# Patient Record
Sex: FEMALE | Race: WHITE | Employment: UNEMPLOYED | URBAN - METROPOLITAN AREA
[De-identification: names, ages, dates, MRNs, and addresses within clinical notes are randomized per-mention and may not be internally consistent; named-entity substitution may affect disease eponyms.]

---

## 2021-09-30 ENCOUNTER — HOSPITAL ENCOUNTER (EMERGENCY)
Age: 31
Discharge: HOME | End: 2021-10-01
Attending: EMERGENCY MEDICINE
Payer: MEDICAID

## 2021-09-30 ENCOUNTER — APPOINTMENT (OUTPATIENT)
Dept: GENERAL RADIOLOGY | Age: 31
End: 2021-09-30
Payer: MEDICAID

## 2021-09-30 VITALS
HEART RATE: 113 BPM | TEMPERATURE: 98.7 F | SYSTOLIC BLOOD PRESSURE: 144 MMHG | OXYGEN SATURATION: 94 % | RESPIRATION RATE: 24 BRPM | DIASTOLIC BLOOD PRESSURE: 87 MMHG

## 2021-09-30 DIAGNOSIS — R07.9 CHEST PAIN, UNSPECIFIED TYPE: Primary | ICD-10-CM

## 2021-09-30 DIAGNOSIS — F11.90 OPIOID USE DISORDER: ICD-10-CM

## 2021-09-30 DIAGNOSIS — F14.90 CRACK COCAINE USE: ICD-10-CM

## 2021-09-30 LAB
ANION GAP SERPL CALCULATED.3IONS-SCNC: 14 MMOL/L (ref 3–16)
BASE EXCESS VENOUS: 4.2 MMOL/L (ref -2–3)
BASOPHILS ABSOLUTE: 0.1 K/UL (ref 0–0.2)
BASOPHILS RELATIVE PERCENT: 0.7 %
BUN BLDV-MCNC: 11 MG/DL (ref 7–20)
CALCIUM SERPL-MCNC: 9.1 MG/DL (ref 8.3–10.6)
CARBOXYHEMOGLOBIN: 5.2 % (ref 0–1.5)
CHLORIDE BLD-SCNC: 103 MMOL/L (ref 99–110)
CO2: 23 MMOL/L (ref 21–32)
CREAT SERPL-MCNC: 0.6 MG/DL (ref 0.6–1.1)
EOSINOPHILS ABSOLUTE: 0.2 K/UL (ref 0–0.6)
EOSINOPHILS RELATIVE PERCENT: 2.2 %
GFR AFRICAN AMERICAN: >60
GFR NON-AFRICAN AMERICAN: >60
GLUCOSE BLD-MCNC: 89 MG/DL (ref 70–99)
HCO3 VENOUS: 26.6 MMOL/L (ref 24–28)
HCT VFR BLD CALC: 37.1 % (ref 36–48)
HEMOGLOBIN, VEN, REDUCED: 2.7 %
HEMOGLOBIN: 13 G/DL (ref 12–16)
LYMPHOCYTES ABSOLUTE: 2.8 K/UL (ref 1–5.1)
LYMPHOCYTES RELATIVE PERCENT: 38.3 %
MCH RBC QN AUTO: 31.2 PG (ref 26–34)
MCHC RBC AUTO-ENTMCNC: 34.9 G/DL (ref 31–36)
MCV RBC AUTO: 89.3 FL (ref 80–100)
METHEMOGLOBIN VENOUS: 0.2 % (ref 0–1.5)
MONOCYTES ABSOLUTE: 0.5 K/UL (ref 0–1.3)
MONOCYTES RELATIVE PERCENT: 7.3 %
NEUTROPHILS ABSOLUTE: 3.8 K/UL (ref 1.7–7.7)
NEUTROPHILS RELATIVE PERCENT: 51.5 %
O2 SAT, VEN: 97 %
PCO2, VEN: 32 MMHG (ref 41–51)
PDW BLD-RTO: 12.3 % (ref 12.4–15.4)
PH VENOUS: 7.53 (ref 7.35–7.45)
PLATELET # BLD: 311 K/UL (ref 135–450)
PMV BLD AUTO: 7.8 FL (ref 5–10.5)
PO2, VEN: 70.4 MMHG (ref 25–40)
POTASSIUM REFLEX MAGNESIUM: 3.2 MMOL/L (ref 3.5–5.1)
RBC # BLD: 4.16 M/UL (ref 4–5.2)
SODIUM BLD-SCNC: 140 MMOL/L (ref 136–145)
TCO2 CALC VENOUS: 28 MMOL/L
WBC # BLD: 7.3 K/UL (ref 4–11)

## 2021-09-30 PROCEDURE — 83735 ASSAY OF MAGNESIUM: CPT

## 2021-09-30 PROCEDURE — 80048 BASIC METABOLIC PNL TOTAL CA: CPT

## 2021-09-30 PROCEDURE — 71046 X-RAY EXAM CHEST 2 VIEWS: CPT

## 2021-09-30 PROCEDURE — 99283 EMERGENCY DEPT VISIT LOW MDM: CPT

## 2021-09-30 PROCEDURE — 36415 COLL VENOUS BLD VENIPUNCTURE: CPT

## 2021-09-30 PROCEDURE — 96374 THER/PROPH/DIAG INJ IV PUSH: CPT

## 2021-09-30 PROCEDURE — 93005 ELECTROCARDIOGRAM TRACING: CPT | Performed by: PHYSICIAN ASSISTANT

## 2021-09-30 PROCEDURE — 6360000002 HC RX W HCPCS: Performed by: PHYSICIAN ASSISTANT

## 2021-09-30 PROCEDURE — 84484 ASSAY OF TROPONIN QUANT: CPT

## 2021-09-30 PROCEDURE — 82803 BLOOD GASES ANY COMBINATION: CPT

## 2021-09-30 PROCEDURE — 85025 COMPLETE CBC W/AUTO DIFF WBC: CPT

## 2021-09-30 RX ORDER — LORAZEPAM 2 MG/ML
1 INJECTION INTRAMUSCULAR ONCE
Status: COMPLETED | OUTPATIENT
Start: 2021-09-30 | End: 2021-09-30

## 2021-09-30 RX ADMIN — LORAZEPAM 1 MG: 2 INJECTION INTRAMUSCULAR; INTRAVENOUS at 23:31

## 2021-09-30 ASSESSMENT — PAIN SCALES - GENERAL: PAINLEVEL_OUTOF10: 10

## 2021-09-30 ASSESSMENT — PAIN DESCRIPTION - LOCATION: LOCATION: CHEST

## 2021-09-30 ASSESSMENT — PAIN DESCRIPTION - PAIN TYPE: TYPE: ACUTE PAIN

## 2021-09-30 ASSESSMENT — PAIN DESCRIPTION - DESCRIPTORS: DESCRIPTORS: PRESSURE;HEAVINESS

## 2021-10-01 LAB
BILIRUBIN URINE: NEGATIVE
BLOOD, URINE: NEGATIVE
CLARITY: CLEAR
COLOR: YELLOW
EKG ATRIAL RATE: 125 BPM
EKG DIAGNOSIS: NORMAL
EKG P AXIS: 5 DEGREES
EKG P-R INTERVAL: 126 MS
EKG Q-T INTERVAL: 332 MS
EKG QRS DURATION: 92 MS
EKG QTC CALCULATION (BAZETT): 479 MS
EKG R AXIS: 72 DEGREES
EKG T AXIS: 39 DEGREES
EKG VENTRICULAR RATE: 125 BPM
GLUCOSE URINE: NEGATIVE MG/DL
KETONES, URINE: NEGATIVE MG/DL
LEUKOCYTE ESTERASE, URINE: NEGATIVE
MAGNESIUM: 2 MG/DL (ref 1.8–2.4)
MICROSCOPIC EXAMINATION: NORMAL
NITRITE, URINE: NEGATIVE
PH UA: 7 (ref 5–8)
PROTEIN UA: NEGATIVE MG/DL
SPECIFIC GRAVITY UA: 1.01 (ref 1–1.03)
TROPONIN: <0.01 NG/ML
URINE TYPE: NORMAL
UROBILINOGEN, URINE: 0.2 E.U./DL

## 2021-10-01 PROCEDURE — 81003 URINALYSIS AUTO W/O SCOPE: CPT

## 2021-10-01 RX ORDER — DROPERIDOL 2.5 MG/ML
0.62 INJECTION, SOLUTION INTRAMUSCULAR; INTRAVENOUS ONCE
Status: DISCONTINUED | OUTPATIENT
Start: 2021-10-01 | End: 2021-10-01 | Stop reason: HOSPADM

## 2021-10-01 NOTE — ED PROVIDER NOTES
ED Attending Attestation Note     Date of evaluation: 9/30/2021    This patient was seen by the advance practice provider. I have seen and examined the patient, agree with the workup, evaluation, management and diagnosis. The care plan has been discussed. I have reviewed the ECG and concur with the JASWINDER's interpretation. My assessment reveals patient with polysubstance abuse presents complaining of chest pain. Patient is mildly tachycardic but otherwise hemodynamically stable. Will check laboratory studies.       Galen Perales MD  10/01/21 2122

## 2021-10-01 NOTE — ED TRIAGE NOTES
Patient arrived to ED with complaints of chest pain. Patient used crack and  Fentanyl earlier today.

## 2021-10-04 ASSESSMENT — ENCOUNTER SYMPTOMS
EYE ITCHING: 0
COUGH: 0
COLOR CHANGE: 0
SORE THROAT: 0
ABDOMINAL DISTENTION: 0
VOMITING: 0
RHINORRHEA: 0
DIARRHEA: 0
BACK PAIN: 0
ABDOMINAL PAIN: 0
EYE REDNESS: 0
CHEST TIGHTNESS: 0
SHORTNESS OF BREATH: 0
WHEEZING: 0
NAUSEA: 0
SINUS PRESSURE: 0
EYE PAIN: 0

## 2021-10-04 NOTE — ED PROVIDER NOTES
[address] [facility] ENCOUNTER          PHYSICIAN ASSISTANT NOTE       Date of evaluation: 9/30/2021    Chief Complaint     Chest Pain and Drug / Alcohol Assessment      History of Present Illness     James Chan is a 36 y.o. female with a past medical history as noted below who presents to the Emergency Department with a complaint of chest pain and arm numbness. The patient reports that approximately an hour prior to emergency department arrival, she had smoked crack cocaine and experience chest pain developed numbness in her bilateral hands and fingers. The patient reports that she routinely smokes crack cocaine, as well as marijuana and does heroin and fentanyl on a regular basis. She reports that the chest pain and numbness is made her significantly agitated and she is somewhat thrashing about on the bed in discomfort. Reports episodes of chest pain in the past, but has never had similar episode to this previously. No history of coronary artery disease or thrombotic disease. Her pain is reported as left-sided, with pressure and heaviness radiating to her left arm with numbness and tingling in her bilateral hands and fingers. She rates pain as a 10 out of 10. She has not taken anything for her pain. She used fentanyl earlier today at approximately 1:00 PM.  She also did a \"rinse\" which is rinsing the cotton unit earlier fentanyl injection for a subsequent injection approximately 2 to 3 hours ago. The patient reports that she can go approximately 5 to 6 hours before she begins to feel dope sick. She is attempted drug rehab numerous times in the past, but has never had an extended period of sobriety in the past 15 years. Denies any recent fevers, chills, sweats or other constitutional symptoms    Review of Systems     Review of Systems   Constitutional: Negative for chills, diaphoresis, fever and unexpected weight change.    HENT: Negative for congestion, drooling, mouth sores, postnasal drip, rhinorrhea, sinus pressure and sore throat. Eyes: Negative for pain, redness and itching. Respiratory: Negative for cough, chest tightness, shortness of breath and wheezing. Cardiovascular: Positive for chest pain and palpitations. Negative for leg swelling. Gastrointestinal: Negative for abdominal distention, abdominal pain, diarrhea, nausea and vomiting. Genitourinary: Negative for dysuria and hematuria. Musculoskeletal: Negative for arthralgias, back pain, gait problem, myalgias, neck pain and neck stiffness. Skin: Negative for color change, pallor and rash. Neurological: Positive for numbness (Tingling sensation in hands and fingers). Negative for dizziness, syncope, weakness, light-headedness and headaches. Hematological: Does not bruise/bleed easily. Psychiatric/Behavioral: Negative for agitation, hallucinations, self-injury, sleep disturbance and suicidal ideas. The patient is not nervous/anxious. All other systems reviewed and are negative. Past Medical, Surgical, Family, and Social History     She has no past medical history on file. She has no past surgical history on file. Her family history is not on file. She reports that she has been smoking. She has been smoking about 1.00 pack per day. She has never used smokeless tobacco. She reports current alcohol use. She reports current drug use. Drugs: Cocaine and IV. Medications     There are no discharge medications for this patient. Allergies     She has No Known Allergies.     Physical Exam     INITIAL VITALS: BP: (!) 144/87, Temp: 98.7 °F (37.1 °C), Pulse: 113, Resp: 24, SpO2: 94 %  Physical Exam    Diagnostic Results     EKG   Interpreted in conjunction with emergency department physician, Miguel Ángel Tafoya MD  Rhythm: normal sinus   Rate: normal  Axis: normal  Ectopy: none  Conduction: normal  ST Segments: normal  T Waves: normal  Q Waves:none  Clinical Impression: Sinus rhythm, normal axis, no ectopy, normal QTC, no evidence of ischemia, injury or infarct  Comparison: No prior ECG for comparison    RADIOLOGY:  XR CHEST (2 VW)   Final Result      No acute pulmonary disease.              LABS:   Results for orders placed or performed during the hospital encounter of 09/30/21   CBC Auto Differential   Result Value Ref Range    WBC 7.3 4.0 - 11.0 K/uL    RBC 4.16 4.00 - 5.20 M/uL    Hemoglobin 13.0 12.0 - 16.0 g/dL    Hematocrit 37.1 36.0 - 48.0 %    MCV 89.3 80.0 - 100.0 fL    MCH 31.2 26.0 - 34.0 pg    MCHC 34.9 31.0 - 36.0 g/dL    RDW 12.3 (L) 12.4 - 15.4 %    Platelets 961 968 - 931 K/uL    MPV 7.8 5.0 - 10.5 fL    Neutrophils % 51.5 %    Lymphocytes % 38.3 %    Monocytes % 7.3 %    Eosinophils % 2.2 %    Basophils % 0.7 %    Neutrophils Absolute 3.8 1.7 - 7.7 K/uL    Lymphocytes Absolute 2.8 1.0 - 5.1 K/uL    Monocytes Absolute 0.5 0.0 - 1.3 K/uL    Eosinophils Absolute 0.2 0.0 - 0.6 K/uL    Basophils Absolute 0.1 0.0 - 0.2 K/uL   Basic Metabolic Panel w/ Reflex to MG   Result Value Ref Range    Sodium 140 136 - 145 mmol/L    Potassium reflex Magnesium 3.2 (L) 3.5 - 5.1 mmol/L    Chloride 103 99 - 110 mmol/L    CO2 23 21 - 32 mmol/L    Anion Gap 14 3 - 16    Glucose 89 70 - 99 mg/dL    BUN 11 7 - 20 mg/dL    CREATININE 0.6 0.6 - 1.1 mg/dL    GFR Non-African American >60 >60    GFR African American >60 >60    Calcium 9.1 8.3 - 10.6 mg/dL   Troponin   Result Value Ref Range    Troponin <0.01 <0.01 ng/mL   Urinalysis, reflex to microscopic   Result Value Ref Range    Color, UA Yellow Straw/Yellow    Clarity, UA Clear Clear    Glucose, Ur Negative Negative mg/dL    Bilirubin Urine Negative Negative    Ketones, Urine Negative Negative mg/dL    Specific Gravity, UA 1.010 1.005 - 1.030    Blood, Urine Negative Negative    pH, UA 7.0 5.0 - 8.0    Protein, UA Negative Negative mg/dL    Urobilinogen, Urine 0.2 <2.0 E.U./dL    Nitrite, Urine Negative Negative    Leukocyte Esterase, Urine Negative Negative Microscopic Examination Not Indicated     Urine Type Voided    Blood Gas, Venous   Result Value Ref Range    pH, Jeramy 7.527 (H) 7.350 - 7.450    pCO2, Jeramy 32.0 (L) 41.0 - 51.0 mmHg    pO2, Jeramy 70.4 (H) 25 - 40 mmHg    HCO3, Venous 26.6 24.0 - 28.0 mmol/L    Base Excess, Jeramy 4.2 (H) -2.0 - 3.0 mmol/L    O2 Sat, Jeramy 97 Not established %    Carboxyhemoglobin 5.2 (H) 0.0 - 1.5 %    MetHgb, Jeramy 0.2 0.0 - 1.5 %    TC02 (Calc), Jeramy 28 mmol/L    Hemoglobin, Jeramy, Reduced 2.70 %   Magnesium   Result Value Ref Range    Magnesium 2.00 1.80 - 2.40 mg/dL   EKG 12 Lead   Result Value Ref Range    Ventricular Rate 125 BPM    Atrial Rate 125 BPM    P-R Interval 126 ms    QRS Duration 92 ms    Q-T Interval 332 ms    QTc Calculation (Bazett) 479 ms    P Axis 5 degrees    R Axis 72 degrees    T Axis 39 degrees    Diagnosis       EKG performed in ER and to be interpreted by ER physician. Confirmed by MD, ER ([id number]),  Meryl Quiles ([id number]) on 10/1/2021 6:03:53 AM       ED BEDSIDE ULTRASOUND:  N/A    RECENT VITALS:  BP: (!) 144/87, Temp: 98.7 °F (37.1 °C), Pulse: 113, Resp: 24, SpO2: 94 %     Procedures     N/A    ED Course     Nursing Notes, Past Medical Hx,Past Surgical Hx, Social Hx, Allergies, and Family Hx were reviewed. The patient was given the following medications:  Orders Placed This Encounter   Medications    LORazepam (ATIVAN) injection 1 mg    DISCONTD: droperidol (INAPSINE) injection 0.625 mg       CONSULTS:  None    MEDICAL DECISION MAKING / ASSESSMENT / Mata Garcia is admitted to the Emergency Department for evaluation of her chief complaint as described in the history of present illness. Complete history and physical was performed by me and my attending. Nursing notes, past medical history, surgical history, family history and social history were reviewed and addressed in the HPI. Benedict Cho is a 36 y.o. female who presents to the emergency department with a complaint of chest pain. Patient reports that approximately an hour prior to presentation to the emergency department, she was smoking crack cocaine. The patient did develop chest pain, which she reported made her very anxious and agitated. Patient reports that her respiratory rate increased frequently and she felt like she was hyperventilating. She subsequently developed numbness and tingling in the hands and fingers bilaterally. She reports that she felt like her heart was going to beat out of her chest.     On arrival to the emergency department, the patient is hemodynamically stable with out noted tachycardia, slight tachypnea and a normal blood pressure. She is agitated and thrashing around in the bed. The patient came to herself, though she denies any visual or auditory hallucinations. Initial twelve-lead EKG demonstrates no evidence of ischemia, injury or infarct. The patient was administered 1 mg of Ativan IV for agitation and to relieve any potential vasospasm caused by the stimulant which could be causing her chest pain. She noted some significant improvement in her symptoms after administration of this medication. Her chest x-ray is unremarkable without evidence of chronic lung or infection. Initial VBG demonstrates respiratory alkalosis and is consistent with her tachypnea. Initial troponin is negative. CBC demonstrates no evidence of anemia, thrombocytopenia or leukocytosis. Her BMP demonstrates slight hypokalemia, which we will correct with diet, but no other electrolyte or renal abnormalities. Her anion gap and bicarbonate levels are normal.  After administration of IV fluids the patient was able to settle down. She continues to report some mild agitation and droperidol was ordered, but the patient refused this medication and instead requested discharge home. The patient has been evaluated and the history and physical exam suggest a benign etiology.   I see nothing to suggest coronary ischemia, myocardial infarction, pulmonary embolism, thoracic aortic dissection, significant pericarditis, pneumonia, pneumothorax, or acute abdomen. I feel the patient can be safely discharged to home with outpatient follow up. Instructions have been given for the patient to return to the ED for any worsening of the symptoms, including but not limited to increased pain, shortness of breath, abdominal pain or weakness. I discussed this plan at length the patient who verbalizes understanding and is in agreement. The patient is currently stable and will be discharged home for continued self-care. Please see patient's AVS for additional discharge instructions. The patient was seen and evaluated by myself and the attending physician, Tori Levy MD, who agrees with my assessment, treatment and plan. Clinical Impression     1. Chest pain, unspecified type    2. Crack cocaine use    3. Opioid use disorder        Disposition     PATIENT REFERRED TO:  Addiction services, [facility]  [phone]  Schedule an appointment as soon as possible for a visit       [facility]  [address] [place] [address]  [address]  [phone]  Go to   If symptoms worsen      DISCHARGE MEDICATIONS:  There are no discharge medications for this patient.       DISPOSITION Decision To Discharge 10/01/2021 12:54:09 AM     FRACISCO Mason  10/04/21 [id number]

## 2022-06-14 ENCOUNTER — OFFICE VISIT (OUTPATIENT)
Dept: GYNECOLOGY | Age: 32
End: 2022-06-14
Payer: COMMERCIAL

## 2022-06-14 VITALS
HEART RATE: 78 BPM | BODY MASS INDEX: 26.02 KG/M2 | HEIGHT: 64 IN | WEIGHT: 152.4 LBS | SYSTOLIC BLOOD PRESSURE: 108 MMHG | RESPIRATION RATE: 17 BRPM | OXYGEN SATURATION: 97 % | DIASTOLIC BLOOD PRESSURE: 62 MMHG

## 2022-06-14 DIAGNOSIS — N92.6 IRREGULAR MENSTRUAL BLEEDING: ICD-10-CM

## 2022-06-14 DIAGNOSIS — Z01.419 WELL WOMAN EXAM WITH ROUTINE GYNECOLOGICAL EXAM: Primary | ICD-10-CM

## 2022-06-14 PROCEDURE — 99385 PREV VISIT NEW AGE 18-39: CPT | Performed by: OBSTETRICS & GYNECOLOGY

## 2022-06-14 RX ORDER — OLANZAPINE 5 MG/1
5 TABLET ORAL DAILY
COMMUNITY
Start: 2022-06-09

## 2022-06-14 RX ORDER — ETONOGESTREL 68 MG/1
68 IMPLANT SUBCUTANEOUS ONCE
COMMUNITY

## 2022-06-14 RX ORDER — LAMOTRIGINE 150 MG/1
150 TABLET ORAL DAILY
COMMUNITY
Start: 2022-04-20

## 2022-06-14 RX ORDER — PROPRANOLOL HYDROCHLORIDE 10 MG/1
10 TABLET ORAL DAILY
COMMUNITY
Start: 2022-06-09

## 2022-06-14 RX ORDER — BUPROPION HYDROCHLORIDE 150 MG/1
150 TABLET ORAL EVERY MORNING
COMMUNITY
Start: 2022-06-09 | End: 2023-06-09

## 2022-06-16 LAB
C TRACH DNA GENITAL QL NAA+PROBE: NEGATIVE
N. GONORRHOEAE DNA: NEGATIVE

## 2022-06-17 ASSESSMENT — ENCOUNTER SYMPTOMS
RESPIRATORY NEGATIVE: 1
EYES NEGATIVE: 1
GASTROINTESTINAL NEGATIVE: 1

## 2022-06-18 NOTE — PROGRESS NOTES
Subjective:      Patient ID: Latisha Mckeon is a 29 y.o. female. Patient is here for annual. Patient with irregular bleeding. Nexplanon has . Gynecologic Exam        Review of Systems   Constitutional: Negative. HENT: Negative. Eyes: Negative. Respiratory: Negative. Cardiovascular: Negative. Gastrointestinal: Negative. Genitourinary: Positive for menstrual problem. Musculoskeletal: Negative. Skin: Negative. Neurological: Negative. Psychiatric/Behavioral: Negative. Date of Birth 1990  Past Medical History:   Diagnosis Date    Abnormal Pap smear of cervix     Chlamydia     Irregular uterine bleeding     Urogenital trichomoniasis      Past Surgical History:   Procedure Laterality Date    DILATION AND CURETTAGE OF UTERUS       Pt had aboration     OB History    Para Term  AB Living   1       1     SAB IAB Ectopic Molar Multiple Live Births                    # Outcome Date GA Lbr Yaw/2nd Weight Sex Delivery Anes PTL Lv   1 AB               Obstetric Comments   Patient had an  in . Social History     Socioeconomic History    Marital status: Single     Spouse name: Not on file    Number of children: Not on file    Years of education: Not on file    Highest education level: Not on file   Occupational History    Not on file   Tobacco Use    Smoking status: Current Every Day Smoker     Packs/day: 1.00    Smokeless tobacco: Never Used   Vaping Use    Vaping Use: Never used   Substance and Sexual Activity    Alcohol use:  Yes    Drug use: Yes     Types: Cocaine, IV     Comment: FENTANYL    Sexual activity: Yes     Partners: Male   Other Topics Concern    Not on file   Social History Narrative    Not on file     Social Determinants of Health     Financial Resource Strain:     Difficulty of Paying Living Expenses: Not on file   Food Insecurity:     Worried About Running Out of Food in the Last Year: Not on file   Nirmal Monaco Ran Out of Food in the Last Year: Not on file   Transportation Needs:     Lack of Transportation (Medical): Not on file    Lack of Transportation (Non-Medical): Not on file   Physical Activity:     Days of Exercise per Week: Not on file    Minutes of Exercise per Session: Not on file   Stress:     Feeling of Stress : Not on file   Social Connections:     Frequency of Communication with Friends and Family: Not on file    Frequency of Social Gatherings with Friends and Family: Not on file    Attends [employer] Services: Not on file    Active Member of [address] [place] [address] [employer] or Organizations: Not on file    Attends Club or Organization Meetings: Not on file    Marital Status: Not on file   Intimate Partner Violence:     Fear of Current or Ex-Partner: Not on file    Emotionally Abused: Not on file    Physically Abused: Not on file    Sexually Abused: Not on file   Housing Stability:     Unable to Pay for Housing in the Last Year: Not on file    Number of [place] in the Last Year: Not on file    Unstable Housing in the Last Year: Not on file     No Known Allergies  Outpatient Medications Marked as Taking for the 6/14/22 encounter (Office Visit) with Shanelle Richey MD   Medication Sig Dispense Refill    buPROPion (WELLBUTRIN XL) 150 MG extended release tablet Take 150 mg by mouth every morning      lamoTRIgine (LAMICTAL) 150 MG tablet Take 150 mg by mouth daily      propranolol (INDERAL) 10 MG tablet Take 10 mg by mouth daily      OLANZapine (ZYPREXA) 5 MG tablet Take 5 mg by mouth daily      etonogestrel (NEXPLANON) 68 MG implant 68 mg by Subdermal route once 2016       History reviewed. No pertinent family history. /62 (Site: Right Upper Arm, Position: Sitting, Cuff Size: Large Adult)   Pulse 78   Resp 17   Ht 5' 4\" (1.626 m)   Wt 152 lb 6.4 oz (69.1 kg)   SpO2 97%   BMI 26.16 kg/m²       Objective:   Physical Exam  Constitutional:       Appearance: Normal appearance.  She is well-developed and normal weight. HENT:      Head: Normocephalic. Nose: Nose normal.      Mouth/Throat:      Mouth: Mucous membranes are moist.      Pharynx: Oropharynx is clear. Eyes:      Pupils: Pupils are equal, round, and reactive to light. Neck:      Thyroid: No thyromegaly. Cardiovascular:      Rate and Rhythm: Normal rate and regular rhythm. Pulses: Normal pulses. Heart sounds: Normal heart sounds. No murmur heard. No friction rub. No gallop. Pulmonary:      Effort: Pulmonary effort is normal. No respiratory distress. Breath sounds: Normal breath sounds. No stridor. No wheezing, rhonchi or rales. Chest:      Chest wall: No tenderness. Breasts:      Right: Normal. No swelling, bleeding, inverted nipple, mass, nipple discharge, skin change or tenderness. Left: Normal. No swelling, bleeding, inverted nipple, mass, nipple discharge, skin change or tenderness. Abdominal:      General: Bowel sounds are normal. There is no distension. Palpations: Abdomen is soft. There is no mass. Tenderness: There is no abdominal tenderness. There is no guarding or rebound. Hernia: No hernia is present. There is no hernia in the left inguinal area. Genitourinary:     General: Normal vulva. Exam position: Lithotomy position. Pubic Area: No rash. Labia:         Right: No rash, tenderness, lesion or injury. Left: No rash, tenderness, lesion or injury. Urethra: No prolapse, urethral pain, urethral swelling or urethral lesion. Vagina: No signs of injury and foreign body. No vaginal discharge, erythema, tenderness, bleeding, lesions or prolapsed vaginal walls. Cervix: No cervical motion tenderness, discharge, friability, lesion, erythema, cervical bleeding or eversion. Uterus: Not deviated, not enlarged, not fixed and not tender. Adnexa:         Right: No mass, tenderness or fullness. Left: No mass, tenderness or fullness. Rectum: No anal fissure or external hemorrhoid. Comments: Normal urethral meatus, normal urethra, nl bladder  Musculoskeletal:         General: No tenderness. Normal range of motion. Cervical back: Normal range of motion and neck supple. No rigidity. Lymphadenopathy:      Cervical: No cervical adenopathy. Lower Body: No right inguinal adenopathy. No left inguinal adenopathy. Skin:     General: Skin is warm and dry. Coloration: Skin is not pale. Findings: No erythema or rash. Neurological:      General: No focal deficit present. Mental Status: She is alert and oriented to person, place, and time. Mental status is at baseline. Deep Tendon Reflexes: Reflexes are normal and symmetric. Psychiatric:         Mood and Affect: Mood normal.         Behavior: Behavior normal.         Thought Content: Thought content normal.         Judgment: Judgment normal.         Assessment:      1. Annual  2. Irregular menses      Plan:      1. Pap, calcium, exercise  2. Feel due to Nexplanon-gave her numbers to call to get removed. Check labs and dna probe.  Can consider ocps but must have Nexplanon out first.        Marcio Cabrera MD

## 2023-08-17 ENCOUNTER — TELEPHONE (OUTPATIENT)
Dept: GYNECOLOGY | Age: 33
End: 2023-08-17

## 2023-08-17 DIAGNOSIS — Z20.6 EXPOSURE TO HIV: Primary | ICD-10-CM

## 2023-08-17 NOTE — TELEPHONE ENCOUNTER
Called and spoke to patient related full message to patient from Dr Tiffanie Rueda. Patient says she is at [facility] ER right now and that this HIV testing is been taken care she says she needed this to be taken care of right away.   Informed Dr Tiffanie Rueda of conservation with patient

## 2023-08-17 NOTE — TELEPHONE ENCOUNTER
Tell patient that she can go to [facility] lab and get HIV. If she wants other testing for STI, she needs to do an e-visit. Tell patient she will need retested 6 months after her exposure as well.

## 2023-08-17 NOTE — TELEPHONE ENCOUNTER
Patient think she has been exposed to HIV and would like to be tested asap.  Please advise      Patient can be reached at [phone]

## 2023-11-11 ENCOUNTER — HOSPITAL ENCOUNTER (EMERGENCY)
Age: 33
Discharge: ANOTHER HOSPITAL | End: 2023-11-11
Attending: EMERGENCY MEDICINE
Payer: COMMERCIAL

## 2023-11-11 ENCOUNTER — APPOINTMENT (OUTPATIENT)
Dept: GENERAL RADIOLOGY | Age: 33
End: 2023-11-11
Payer: COMMERCIAL

## 2023-11-11 VITALS
DIASTOLIC BLOOD PRESSURE: 85 MMHG | WEIGHT: 150 LBS | SYSTOLIC BLOOD PRESSURE: 119 MMHG | TEMPERATURE: 98 F | BODY MASS INDEX: 25.61 KG/M2 | HEART RATE: 73 BPM | OXYGEN SATURATION: 99 % | RESPIRATION RATE: 18 BRPM | HEIGHT: 64 IN

## 2023-11-11 DIAGNOSIS — S87.81XA CRUSH INJURY LOWER LEG, RIGHT, INITIAL ENCOUNTER: Primary | ICD-10-CM

## 2023-11-11 DIAGNOSIS — S80.811A ABRASION OF RIGHT LOWER EXTREMITY, INITIAL ENCOUNTER: ICD-10-CM

## 2023-11-11 PROCEDURE — 73560 X-RAY EXAM OF KNEE 1 OR 2: CPT

## 2023-11-11 PROCEDURE — 6360000002 HC RX W HCPCS: Performed by: EMERGENCY MEDICINE

## 2023-11-11 PROCEDURE — 73610 X-RAY EXAM OF ANKLE: CPT

## 2023-11-11 PROCEDURE — 90714 TD VACC NO PRESV 7 YRS+ IM: CPT | Performed by: EMERGENCY MEDICINE

## 2023-11-11 PROCEDURE — 96372 THER/PROPH/DIAG INJ SC/IM: CPT

## 2023-11-11 PROCEDURE — 73080 X-RAY EXAM OF ELBOW: CPT

## 2023-11-11 PROCEDURE — 99285 EMERGENCY DEPT VISIT HI MDM: CPT

## 2023-11-11 PROCEDURE — 90471 IMMUNIZATION ADMIN: CPT | Performed by: EMERGENCY MEDICINE

## 2023-11-11 PROCEDURE — 73590 X-RAY EXAM OF LOWER LEG: CPT

## 2023-11-11 RX ORDER — MORPHINE SULFATE 4 MG/ML
4 INJECTION INTRAVENOUS ONCE
Status: COMPLETED | OUTPATIENT
Start: 2023-11-11 | End: 2023-11-11

## 2023-11-11 RX ADMIN — CLOSTRIDIUM TETANI TOXOID ANTIGEN (FORMALDEHYDE INACTIVATED) AND CORYNEBACTERIUM DIPHTHERIAE TOXOID ANTIGEN (FORMALDEHYDE INACTIVATED) 0.5 ML: 5; 2 INJECTION, SUSPENSION INTRAMUSCULAR at 04:52

## 2023-11-11 RX ADMIN — MORPHINE SULFATE 4 MG: 4 INJECTION INTRAVENOUS at 03:01

## 2023-11-11 ASSESSMENT — PAIN SCALES - GENERAL
PAINLEVEL_OUTOF10: 10

## 2023-11-11 ASSESSMENT — PAIN - FUNCTIONAL ASSESSMENT: PAIN_FUNCTIONAL_ASSESSMENT: 0-10

## 2023-11-11 NOTE — ED NOTES
Pt sts that pain has not improved despite pain medication. Pt lower leg remains soft, pedal pulses strong, cap refill brisk.  MD at bedside to discuss possible transfer to [facility] for evaluation for crush injury due to pts increase in pain and inability to move leg      Simi Benavidez RN  11/11/23 [id number]

## 2024-02-26 ENCOUNTER — HOSPITAL ENCOUNTER (EMERGENCY)
Age: 34
Discharge: HOME | End: 2024-02-26
Attending: STUDENT IN AN ORGANIZED HEALTH CARE EDUCATION/TRAINING PROGRAM
Payer: COMMERCIAL

## 2024-02-26 VITALS
WEIGHT: 117 LBS | TEMPERATURE: 97.5 F | SYSTOLIC BLOOD PRESSURE: 116 MMHG | DIASTOLIC BLOOD PRESSURE: 78 MMHG | RESPIRATION RATE: 18 BRPM | HEART RATE: 101 BPM | BODY MASS INDEX: 20.08 KG/M2

## 2024-02-26 DIAGNOSIS — S61.217A LACERATION OF LEFT LITTLE FINGER WITHOUT FOREIGN BODY WITHOUT DAMAGE TO NAIL, INITIAL ENCOUNTER: Primary | ICD-10-CM

## 2024-02-26 PROCEDURE — 99283 EMERGENCY DEPT VISIT LOW MDM: CPT

## 2024-02-26 PROCEDURE — 12001 RPR S/N/AX/GEN/TRNK 2.5CM/<: CPT

## 2024-02-26 RX ORDER — AMOXICILLIN AND CLAVULANATE POTASSIUM 875; 125 MG/1; MG/1
1 TABLET, FILM COATED ORAL 2 TIMES DAILY
Qty: 14 TABLET | Refills: 0 | Status: SHIPPED | OUTPATIENT
Start: 2024-02-26 | End: 2024-03-04

## 2024-02-26 ASSESSMENT — PAIN - FUNCTIONAL ASSESSMENT: PAIN_FUNCTIONAL_ASSESSMENT: NONE - DENIES PAIN

## 2024-02-26 ASSESSMENT — ENCOUNTER SYMPTOMS
RESPIRATORY NEGATIVE: 1
ALLERGIC/IMMUNOLOGIC NEGATIVE: 1
GASTROINTESTINAL NEGATIVE: 1
EYES NEGATIVE: 1

## 2024-02-26 NOTE — DISCHARGE INSTRUCTIONS
You were seen in the Emergency Department for evaluation of a cut (laceration) to your left pinkie finger. Your laceration was thoroughly irrigated and repaired with sutures. Please see below for full instructions regarding care for your laceration:    4 stitches (sutures) were placed. Your sutures are non-absorbable, and they need to be removed in 7-10 days. They can be removed at your primary care doctor's office, Urgent Care, or the Emergency Department.  Please keep the wound completely dry for the first 24 hours. After that, clean the area with mild soap and water twice daily and gently pat dry with a clean towel or cloth. Do not scrub at the incision or pull at the sutures. Please do not soak your wound until it is completely healed. Please do not use hydrogen peroxide, iodine-based solutions, or alcohol, which can slow healing and will probably be painful!  You can remove the bandage after 24 hours at your convenience. After this, no further dressings are necessary. You may cover the wound with a thin layer of antibiotic ointment, such as bacitracin or neosporin. Some people can develop allergic skin rashes to topical antibiotics, especially neomycin (a component of neosporin), so you should avoid this if you have a known sensitivity. You can apply the antibiotic ointment twice daily until the wound heals.  For pain, you can take acetaminophen (Tylenol) 1000 mg and ibuprofen (Advil, Aleve, Motrin) 600 mg every 6 hours as needed, or alternating every 3 hours (e.g., Tylenol at 12 PM, ibuprofen at 3 PM, Tylenol at 6 PM, ibuprofen at 9 PM, and so on). Do not take these medications for more than 1 week without consulting your primary care doctor.  Please DO NOT apply any lotions or creams on your scar until it is healed and/or a medical provider has cleared you to do so. The scar will continue to heal for up to 1 year and will become much less visible and tender. The tenderness can be improved by rubbing the scar

## 2024-02-26 NOTE — ED PROVIDER NOTES
ED Attending Attestation Note     Date of evaluation: 2/26/2024    I have discussed the case with the resident. I have personally performed a history, physical exam, and my own medical decision making. I have reviewed the note and agree with the findings and plan. My assessment reveals a well-appearing 33-year-old female with a laceration at the base of her fifth digit.  Laceration occurred almost 48 hours ago however is quite gaping and goes across the joint.  Given the location of the laceration, feel it would be best for her to be sutured to heal by primary intention rather than letting heal by secondary intention.  The patient was anesthetized and the wound irrigated and sutured.  Please see procedure note for further details.  I was present for all critical portions of the procedure.  The patient was discharged with a prescription for Augmentin given the chronicity of her laceration and the closure for primary healing.  Patient given strict return precautions and instruction to follow-up with her PCP for a wound check and for suture removal.       Milo Mckinley MD  02/26/24 [id number]    
approximately 48 hours prior to washout.  No evidence of retained foreign body on examination.    Medical Decision Making  Risk  Prescription drug management.        This patient was also evaluated by the attending physician. All care plans werediscussed and agreed upon.    Clinical Impression     1. Laceration of left little finger without foreign body without damage to nail, initial encounter        Disposition     PATIENT REFERRED TO:  No follow-up provider specified.    DISCHARGE MEDICATIONS:  New Prescriptions    AMOXICILLIN-CLAVULANATE (AUGMENTIN) 875-125 MG PER TABLET    Take 1 tablet by mouth 2 times daily for 7 days       DISPOSITION Decision To Discharge 02/26/2024 04:50:56 PM        Diagnostic Results and Other Data     RADIOLOGY:  No orders to display       LABS:   No results found for this visit on 02/26/24.    ED BEDSIDE ULTRASOUND:  No results found.    RECENT VITALS:  BP: 116/78, Temp: 97.5 °F (36.4 °C), Pulse: (!) 101,Respirations: 18,       Procedures     Lac Repair    Date/Time: 2/26/2024 4:23 PM    Performed by: Juarez Roa MD  Authorized by: Milo Mckinley MD    Consent:     Consent obtained:  Verbal    Consent given by:  Patient    Risks, benefits, and alternatives were discussed: yes      Risks discussed:  Infection, pain and need for additional repair    Alternatives discussed:  No treatment and delayed treatment  Universal protocol:     Patient identity confirmed:  Verbally with patient  Anesthesia:     Anesthesia method:  Local infiltration    Local anesthetic:  Lidocaine 1% WITH epi  Laceration details:     Location:  Finger    Finger location:  L small finger    Length (cm):  1.5    Depth (mm):  3  Pre-procedure details:     Preparation:  Patient was prepped and draped in usual sterile fashion  Exploration:     Limited defect created (wound extended): no      Hemostasis obtained with: N/A.    Wound exploration: wound explored through full range of motion and entire depth of wound